# Patient Record
Sex: MALE | URBAN - METROPOLITAN AREA
[De-identification: names, ages, dates, MRNs, and addresses within clinical notes are randomized per-mention and may not be internally consistent; named-entity substitution may affect disease eponyms.]

---
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
## 2024-01-14 DIAGNOSIS — J45.41 MODERATE PERSISTENT ASTHMA WITH ACUTE EXACERBATION: ICD-10-CM

## 2024-01-15 NOTE — TELEPHONE ENCOUNTER
Please advise patient to reach out to insurance company. He saw pulm who recommended symbicort.     He needs to find out which steroid inhaler his insurance covers. I sent multiple which were denied.

## 2024-01-16 RX ORDER — FLUTICASONE PROPIONATE AND SALMETEROL 100; 50 UG/1; UG/1
1 POWDER RESPIRATORY (INHALATION) 2 TIMES DAILY
Qty: 1 BLISTER | Refills: 0 | Status: SHIPPED | OUTPATIENT
Start: 2024-01-16

## 2024-01-30 ENCOUNTER — OFFICE VISIT (OUTPATIENT)
Dept: PULMONOLOGY | Facility: CLINIC | Age: 19
End: 2024-01-30
Payer: COMMERCIAL

## 2024-01-30 VITALS
HEART RATE: 68 BPM | BODY MASS INDEX: 23.64 KG/M2 | HEIGHT: 68 IN | SYSTOLIC BLOOD PRESSURE: 114 MMHG | OXYGEN SATURATION: 96 % | TEMPERATURE: 98 F | DIASTOLIC BLOOD PRESSURE: 60 MMHG | WEIGHT: 156 LBS

## 2024-01-30 DIAGNOSIS — J45.20 MILD INTERMITTENT ASTHMA WITHOUT COMPLICATION: Primary | ICD-10-CM

## 2024-01-30 PROCEDURE — 94010 BREATHING CAPACITY TEST: CPT | Performed by: INTERNAL MEDICINE

## 2024-01-30 PROCEDURE — 99213 OFFICE O/P EST LOW 20 MIN: CPT | Performed by: INTERNAL MEDICINE

## 2024-01-30 RX ORDER — TIOTROPIUM BROMIDE INHALATION SPRAY 1.56 UG/1
2 SPRAY, METERED RESPIRATORY (INHALATION) DAILY
Qty: 4 G | Refills: 0 | Status: SHIPPED | COMMUNITY
Start: 2024-01-30

## 2024-01-30 NOTE — PROGRESS NOTES
Assessment/Plan:    Mild intermittent asthma  This patient is clinical improvement with the daily use of Symbicort.  He rarely uses albuterol.  In spite of this however patient's spirometry is worse than the last visit.  Concerned that he remains with persistent airflow obstruction which seems severe.  To start taking Spiriva at low-dose as a daily basis.  Will call him in a few weeks to ensure that he is doing better.  I anticipate that he will be on Spiriva daily in addition to the Symbicort.  Will meet again in the springtime when his allergies become more active with repeat spirometry.       Diagnoses and all orders for this visit:    Mild intermittent asthma without complication  -     tiotropium (Spiriva Respimat) 1.25 MCG/ACT AERS inhaler; Inhale 2 puffs daily  -     POCT spirometry          Subjective:      Patient ID: Jocelynn Diaz is a 18 y.o. male.    This patient returns for mild remittent asthma.  On the last visit he had an abnormal spirometry with moderate airflow obstruction.  He senses improvement with the daily use of Symbicort.  He states he rarely uses albuterol.  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  No recent infection or ER visits.  No allergy symptoms but anticipates than in the springtime.  Never did have his CBC or IgE measured. No other new Problems.  Patient does not sense wheezing on any regular basis.  No nocturnal symptoms.        The following portions of the patient's history were reviewed and updated as appropriate: allergies, current medications, past family history, past medical history, past social history, past surgical history and problem list.    Review of Systems   Constitutional:  Negative for activity change and unexpected weight change.   HENT:  Negative for congestion and sinus pressure.    Respiratory:  Negative for cough, shortness of breath and wheezing.    Cardiovascular:  Negative for chest pain, palpitations and leg swelling.   Allergic/Immunologic:  "Positive for environmental allergies.         Objective:      /60 (BP Location: Left arm, Patient Position: Sitting, Cuff Size: Standard)   Pulse 68   Temp 98 °F (36.7 °C) (Tympanic)   Ht 5' 8\" (1.727 m)   Wt 70.8 kg (156 lb)   SpO2 96%   BMI 23.72 kg/m²          Physical Exam  Vitals reviewed.   Constitutional:       General: He is not in acute distress.     Appearance: He is normal weight. He is not ill-appearing.   Cardiovascular:      Rate and Rhythm: Normal rate and regular rhythm.      Pulses:           Radial pulses are 2+ on the right side and 2+ on the left side.      Heart sounds: Normal heart sounds.   Pulmonary:      Effort: Pulmonary effort is normal.      Breath sounds: Normal breath sounds. No wheezing or rhonchi.   Musculoskeletal:         General: No swelling.      Cervical back: No rigidity or tenderness.      Right lower leg: No edema.      Left lower leg: No edema.   Lymphadenopathy:      Cervical: No cervical adenopathy.   Skin:     General: Skin is warm and dry.   Neurological:      Mental Status: He is alert and oriented to person, place, and time.   Psychiatric:         Mood and Affect: Mood normal.         Behavior: Behavior normal.         "

## 2024-01-30 NOTE — ASSESSMENT & PLAN NOTE
This patient is clinical improvement with the daily use of Symbicort.  He rarely uses albuterol.  In spite of this however patient's spirometry is worse than the last visit.  Concerned that he remains with persistent airflow obstruction which seems severe.  To start taking Spiriva at low-dose as a daily basis.  Will call him in a few weeks to ensure that he is doing better.  I anticipate that he will be on Spiriva daily in addition to the Symbicort.  Will meet again in the springtime when his allergies become more active with repeat spirometry.

## 2024-02-24 DIAGNOSIS — J45.41 MODERATE PERSISTENT ASTHMA WITH ACUTE EXACERBATION: ICD-10-CM

## 2024-02-24 RX ORDER — FLUTICASONE PROPIONATE AND SALMETEROL 100; 50 UG/1; UG/1
1 POWDER RESPIRATORY (INHALATION) 2 TIMES DAILY
Qty: 60 BLISTER | Refills: 0 | Status: SHIPPED | OUTPATIENT
Start: 2024-02-24

## 2024-03-20 DIAGNOSIS — J45.41 MODERATE PERSISTENT ASTHMA WITH ACUTE EXACERBATION: ICD-10-CM

## 2024-03-20 RX ORDER — FLUTICASONE PROPIONATE AND SALMETEROL 100; 50 UG/1; UG/1
POWDER RESPIRATORY (INHALATION)
Qty: 90 BLISTER | Refills: 0 | Status: SHIPPED | OUTPATIENT
Start: 2024-03-20

## 2024-04-17 ENCOUNTER — TELEPHONE (OUTPATIENT)
Dept: FAMILY MEDICINE CLINIC | Facility: CLINIC | Age: 19
End: 2024-04-17

## 2024-04-17 NOTE — TELEPHONE ENCOUNTER
Patient's mother called in regards to patients Symbicort inhaler, however patient does not have updated communication consent form. Informed mother that I am unable to relay any information without speaking to patient first or patient updating communication consent form. Mother was very upset and proceeded to tell me that I would be responsible if something were to happen to her son because the office gives her information on her son without any problems. Informed mother that per HIPAA we are not to be giving out any medical information since he is above the age of 18. Also mother stated that her number is the main number on his chart. It should be the sons phone number on the chart since he is above the age of 18. Forwarding to office to address and update patients chart.

## 2024-04-17 NOTE — TELEPHONE ENCOUNTER
Message is being forwarded to the office.     Mother called rx line- verbal consent given from patient to speak to mother regarding prescription since he is unable to speak due to patient being at work. I did advise mother the reasoning why information cannot be shared and it was due to patient not filling out an updated communication form. She proceeded to tell me he was in recently and a form was not given to him. Patient's mother was advised to have son take the time to update his communication form online or in the office to avoid the hassle in the future.     Patient needs a prescription that needs renewal. Please review - patient is completely out  budesonide-formoterol (SYMBICORT) 80-4.5 MCG/ACT inhaler   Dispense quantity 10.2g with 5 refills.  Sent to   [id number] - [place] - [phone] [facility]

## 2024-04-17 NOTE — TELEPHONE ENCOUNTER
Called pt at preferred number in regards to symbicort inhaler -- pt mother stated it was addressed and then hung up. No further action needed.

## 2024-04-17 NOTE — TELEPHONE ENCOUNTER
It seems that Symbicort was denied by insurance.  Advair alternative was apparently approved and prescribed on March 20th.

## 2024-04-18 ENCOUNTER — TELEPHONE (OUTPATIENT)
Age: 19
End: 2024-04-18

## 2024-04-18 DIAGNOSIS — J45.20 MILD INTERMITTENT ASTHMA WITHOUT COMPLICATION: Primary | ICD-10-CM

## 2024-04-18 RX ORDER — FLUTICASONE FUROATE AND VILANTEROL 200; 25 UG/1; UG/1
1 POWDER RESPIRATORY (INHALATION) DAILY
Qty: 60 BLISTER | Refills: 5 | Status: SHIPPED | OUTPATIENT
Start: 2024-04-18

## 2024-04-18 NOTE — PROGRESS NOTES
Message from the patient's mother that Wixela is not controlling his asthma symptoms.  The computer indicates that Breo is an alternative but Symbicort is not covered.  Therefore I will prescribe for him Breo.  If this is unsuccessful we will have to put in a preauthorization for Symbicort.

## 2024-04-18 NOTE — TELEPHONE ENCOUNTER
Please put in a new script for Symbicort if appropriate. I can start the PA process once other medication has been d/c'd and new script is put in.    Thank you

## 2024-04-18 NOTE — TELEPHONE ENCOUNTER
Called and spoke w pt mother -- informed of message. Pt mother again is agitated as to medication not being covered. She states that Advair is not working as well as the Symbicort and pt would like to be Re-prescribed symbicort specifically. I informed again of lack of insurance coverage and advised that pt mother reach out to Dr. Aponte's office regarding the issue as this is pt pulmonologist at which point pt mother hung up on me again.

## 2024-04-18 NOTE — TELEPHONE ENCOUNTER
Patients mother called the RX Refill Line. Message is being forwarded to the office. Forwarding to PA team for review.       Patients mother is requesting a new script for budesonide-formoterol (SYMBICORT) 80-4.5 MCG/ACT inhaler, She stated they got this filled once and then moving forward the office told her it was no longer covered and prescribed wixela and spiriva. The patients mother stated neither of those medications are working for the patient and they would like to get Symbicort. She also stated she never received notice from her insurance that Symbicort was not cover so she is unsure why the office is telling her this. I personally did not see anything in the patients files where a PA was denied, There is no documents in Media for Symbicort that I see. There are other encounter if those need to be reviewed for information. Can we please contact the patients insurance to see if indeed this medication does need a PA and if so get the process started, as the alternative are not working. The patient will also now need a new script since the previous one has been discontinued. I will forward to the PA team and the office as I am unsure who would handle this for the patient at this point.    Please contact patients mother at

## 2024-04-30 ENCOUNTER — TELEPHONE (OUTPATIENT)
Age: 19
End: 2024-04-30

## 2024-04-30 ENCOUNTER — OFFICE VISIT (OUTPATIENT)
Dept: PULMONOLOGY | Facility: CLINIC | Age: 19
End: 2024-04-30
Payer: COMMERCIAL

## 2024-04-30 VITALS
OXYGEN SATURATION: 97 % | HEIGHT: 68 IN | DIASTOLIC BLOOD PRESSURE: 72 MMHG | TEMPERATURE: 98.2 F | WEIGHT: 160 LBS | BODY MASS INDEX: 24.25 KG/M2 | HEART RATE: 58 BPM | SYSTOLIC BLOOD PRESSURE: 110 MMHG

## 2024-04-30 DIAGNOSIS — J45.20 MILD INTERMITTENT ASTHMA, UNSPECIFIED WHETHER COMPLICATED: ICD-10-CM

## 2024-04-30 PROCEDURE — 99213 OFFICE O/P EST LOW 20 MIN: CPT | Performed by: INTERNAL MEDICINE

## 2024-04-30 RX ORDER — ALBUTEROL SULFATE 90 UG/1
2 AEROSOL, METERED RESPIRATORY (INHALATION) EVERY 6 HOURS PRN
Qty: 18 G | Refills: 5 | Status: SHIPPED | OUTPATIENT
Start: 2024-04-30

## 2024-04-30 NOTE — ASSESSMENT & PLAN NOTE
Asthma has been calm since being on Breo.  I get the feeling patient sometimes neglects to take this on a daily basis and I encouraged that.  He also is taking low-dose Spiriva.  Rarely uses albuterol which I renewed.  I encouraged him to have the 2 blood samples that I requested earlier to see whether he has a tendency towards allergy.  This information sometimes is also helpful if patient's need to be on injectable medications to control their asthma.

## 2024-04-30 NOTE — PROGRESS NOTES
Assessment/Plan:    Mild intermittent asthma  Asthma has been calm since being on Breo.  I get the feeling patient sometimes neglects to take this on a daily basis and I encouraged that.  He also is taking low-dose Spiriva.  Rarely uses albuterol which I renewed.  I encouraged him to have the 2 blood samples that I requested earlier to see whether he has a tendency towards allergy.  This information sometimes is also helpful if patient's need to be on injectable medications to control their asthma.     Diagnoses and all orders for this visit:    Mild intermittent asthma, unspecified whether complicated  -     albuterol (ProAir HFA) 90 mcg/act inhaler; Inhale 2 puffs every 6 (six) hours as needed for wheezing          Subjective:      Patient ID: Jocelynn Diaz is a 18 y.o. male.    This patient returns for reevaluation of asthma.  Since his previous visit he has been started on Breo which is covered by his insurance.  He senses that this has been very helpful for him.  Previously he had been on Advair which was not helpful.  I get the feeling that he does not take this every day and I encouraged him to do so.  He is using Spiriva on a daily basis.  Rarely uses albuterol now.  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  Symptoms of allergy are minimal if at all present.  I asked him to have those previous blood samples requested to see if he has a tendency towards allergy.  Did have allergy as a younger person.  States that he previously had eczema.  No one in the household smokes.  Not sensitive to odor.        The following portions of the patient's history were reviewed and updated as appropriate: allergies, current medications, past family history, past medical history, past social history, past surgical history and problem list.    Review of Systems   Constitutional:  Negative for activity change and unexpected weight change.   HENT:  Positive for  "rhinorrhea. Negative for congestion.    Eyes:  Negative for itching.   Respiratory:  Negative for cough, shortness of breath and wheezing.    Cardiovascular:  Negative for chest pain, palpitations and leg swelling.   Allergic/Immunologic: Negative for environmental allergies.        Allergy-probably not         Objective:      /72 (BP Location: Left arm, Patient Position: Sitting, Cuff Size: Standard)   Pulse 58   Temp 98.2 °F (36.8 °C) (Tympanic)   Ht 5' 8\" (1.727 m)   Wt 72.6 kg (160 lb)   SpO2 97%   BMI 24.33 kg/m²          Physical Exam  Vitals reviewed.   Constitutional:       General: He is not in acute distress.     Appearance: He is normal weight. He is not ill-appearing.   Cardiovascular:      Rate and Rhythm: Normal rate and regular rhythm.      Pulses:           Radial pulses are 2+ on the right side and 2+ on the left side.      Heart sounds: Normal heart sounds.   Pulmonary:      Effort: Pulmonary effort is normal.      Breath sounds: Normal breath sounds. No stridor. No wheezing.   Musculoskeletal:         General: No swelling.      Cervical back: No rigidity or tenderness.      Right lower leg: No edema.      Left lower leg: No edema.   Lymphadenopathy:      Cervical: No cervical adenopathy.   Skin:     General: Skin is warm and dry.   Neurological:      Mental Status: He is alert and oriented to person, place, and time.   Psychiatric:         Mood and Affect: Mood normal.         Behavior: Behavior normal.         "

## 2024-05-13 ENCOUNTER — TELEPHONE (OUTPATIENT)
Age: 19
End: 2024-05-13

## 2024-05-13 DIAGNOSIS — L30.8 OTHER ECZEMA: Primary | ICD-10-CM

## 2024-05-13 RX ORDER — MOMETASONE FUROATE 1 MG/G
CREAM TOPICAL DAILY
Qty: 45 G | Refills: 3 | Status: SHIPPED | OUTPATIENT
Start: 2024-05-13

## 2024-05-13 NOTE — TELEPHONE ENCOUNTER
Mom calling stating son is having issues with Eczema, asked if Dr. Aponte handles. Advised to call PCP, but will ask if you could assist. Stated this was brought up in the last visit. Please advise.

## 2024-05-13 NOTE — TELEPHONE ENCOUNTER
Pt's mother called. Pt has eczema on his neck and arms. I explained to mother that he would need an appt. It does not look like he was seen for eczema. She declined and said she would didn't want to bring him for every little thing and to just ask if cream can be sent to [facility] on [facility]. Please, advise.

## 2024-10-29 ENCOUNTER — OFFICE VISIT (OUTPATIENT)
Dept: PULMONOLOGY | Facility: CLINIC | Age: 19
End: 2024-10-29
Payer: COMMERCIAL

## 2024-10-29 VITALS
OXYGEN SATURATION: 95 % | DIASTOLIC BLOOD PRESSURE: 72 MMHG | HEART RATE: 60 BPM | TEMPERATURE: 96.7 F | HEIGHT: 68 IN | WEIGHT: 161.8 LBS | SYSTOLIC BLOOD PRESSURE: 120 MMHG | BODY MASS INDEX: 24.52 KG/M2

## 2024-10-29 DIAGNOSIS — J45.20 MILD INTERMITTENT ASTHMA WITHOUT COMPLICATION: Primary | ICD-10-CM

## 2024-10-29 PROCEDURE — 99212 OFFICE O/P EST SF 10 MIN: CPT | Performed by: INTERNAL MEDICINE

## 2024-10-29 RX ORDER — FLUTICASONE PROPIONATE AND SALMETEROL 100; 50 UG/1; UG/1
1 POWDER RESPIRATORY (INHALATION) 2 TIMES DAILY
COMMUNITY

## 2024-10-29 NOTE — PROGRESS NOTES
Assessment/Plan:    Mild intermittent asthma  Asthma has been calm since we last spoke.  He has not had any exacerbations.  Had a respiratory infection about a month ago without any major problems with asthma.  Previous Breo has been changed to fluticasone-salmeterol and he has Wixela brand.  Depending on the course he might do just as well with albuterol-budesonide if this were available to him.  No longer takes Spiriva and does not sense the need for it.  Uses albuterol primarily in a preventative fashion but has not had any flareups requiring symptomatic treatment.  Has springtime allergies.     Diagnoses and all orders for this visit:    Mild intermittent asthma without complication    Other orders  -     Fluticasone-Salmeterol (Advair) 100-50 mcg/dose inhaler; Inhale 1 puff 2 (two) times a day Rinse mouth after use.          Subjective:      Patient ID: Jocelynn Diaz is a 19 y.o. male.    The patient returns for reevaluation of allergic asthma.  Since his last visit here in April he has not had any flares of asthma.  Had a respiratory infection about a month ago without any major problems with asthma.  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  Has springtime allergies but does not recall that there was any major problem in the spring.  The longer takes Spiriva based on a sense of not needing it.  Previous Breo has been switched to fluticasone-salmeterol and he shows me that he has Wixela brand.  Uses albuterol only in a preventative fashion.  No new other health problems.        The following portions of the patient's history were reviewed and updated as appropriate: allergies, current medications, past family history, past medical history, past social history, past surgical history and problem list.    Review of Systems   Constitutional:  Negative for activity change and unexpected weight change.   HENT:  Negative for sinus pressure.    Respiratory:  Negative for cough, shortness of breath  "and wheezing.    Cardiovascular:  Negative for chest pain, palpitations and leg swelling.   Allergic/Immunologic: Positive for environmental allergies.         Objective:      /72 (BP Location: Left arm, Patient Position: Sitting, Cuff Size: Standard)   Pulse 60   Temp (!) 96.7 °F (35.9 °C) (Tympanic)   Ht 5' 8\" (1.727 m)   Wt 73.4 kg (161 lb 12.8 oz)   SpO2 95%   BMI 24.60 kg/m²          Physical Exam  Vitals reviewed.   Constitutional:       General: He is not in acute distress.     Appearance: He is normal weight. He is not ill-appearing.   Cardiovascular:      Rate and Rhythm: Normal rate and regular rhythm.      Pulses:           Radial pulses are 2+ on the right side and 2+ on the left side.      Heart sounds: Normal heart sounds.   Pulmonary:      Effort: Pulmonary effort is normal.      Breath sounds: Examination of the right-lower field reveals wheezing. Examination of the left-lower field reveals wheezing. Wheezing present. No rhonchi.      Comments: Few inspiratory squeaks in both lower lobes  Musculoskeletal:         General: No swelling.      Cervical back: No rigidity or tenderness.      Right lower leg: No edema.      Left lower leg: No edema.   Lymphadenopathy:      Cervical: No cervical adenopathy.   Skin:     General: Skin is warm and dry.   Neurological:      Mental Status: He is alert and oriented to person, place, and time.   Psychiatric:         Mood and Affect: Mood normal.         Behavior: Behavior normal.         "

## 2024-10-29 NOTE — ASSESSMENT & PLAN NOTE
Asthma has been calm since we last spoke.  He has not had any exacerbations.  Had a respiratory infection about a month ago without any major problems with asthma.  Previous Breo has been changed to fluticasone-salmeterol and he has Wixela brand.  Depending on the course he might do just as well with albuterol-budesonide if this were available to him.  No longer takes Spiriva and does not sense the need for it.  Uses albuterol primarily in a preventative fashion but has not had any flareups requiring symptomatic treatment.  Has springtime allergies.

## 2024-12-02 DIAGNOSIS — J45.20 MILD INTERMITTENT ASTHMA WITHOUT COMPLICATION: Primary | ICD-10-CM

## 2024-12-02 NOTE — TELEPHONE ENCOUNTER
Reason for call:   [x] Refill   [] Prior Auth  [] Other:     Office:   [] PCP/Provider -   [x] Specialty/Provider - pulm deepika/Fay    Fluticasone-Salmeterol 100-50mcg  1 puff bid #60    Albuterol 0.083% solution  1 vial q6h prn #180ml    Pharmacy: [facility]/pharmacy #[id number]  [place] - [address] [place] [address] [phone]     Does the patient have enough for 3 days?   [] Yes   [x] No - Send as HP to POD

## 2024-12-03 ENCOUNTER — TELEPHONE (OUTPATIENT)
Age: 19
End: 2024-12-03

## 2024-12-03 DIAGNOSIS — J45.20 MILD INTERMITTENT ASTHMA, UNSPECIFIED WHETHER COMPLICATED: ICD-10-CM

## 2024-12-03 RX ORDER — FLUTICASONE PROPIONATE AND SALMETEROL 100; 50 UG/1; UG/1
1 POWDER RESPIRATORY (INHALATION) 2 TIMES DAILY
Qty: 60 BLISTER | Refills: 5 | Status: SHIPPED | OUTPATIENT
Start: 2024-12-03 | End: 2024-12-10 | Stop reason: SDUPTHER

## 2024-12-03 NOTE — TELEPHONE ENCOUNTER
PA for ADVAIR SUBMITTED to [facility]    via    []CMM-KEY:   [x]Surescripts-Case ID # [id number]  []Availity-Auth ID # NDC #   []Faxed to plan   []Other website   []Phone call Case ID #     X-PA sent as URGENT    All office notes, labs and other pertaining documents and studies sent. Clinical questions answered. Awaiting determination from insurance company.     Turnaround time for your insurance to make a decision on your Prior Authorization can take 7-21 business days.

## 2024-12-03 NOTE — TELEPHONE ENCOUNTER
Pt's mother calls and states she is following up on refill requesting informed her it was sent to the pharmacy today. She verbalizes understanding and gives thanks

## 2024-12-03 NOTE — TELEPHONE ENCOUNTER
Reason for call:   [x] Refill   [] Prior Auth  [] Other:     Office:   [x] PCP/Provider -   [] Specialty/Provider -     Medication: albuterol (2.5 mg/3 mL) 0.083 % nebulizer solution     Dose/Frequency: Take 3 mL (2.5 mg total) by nebulization every 6 (six) hours as needed for wheezing or shortness of breath     Quantity: 180 ml    Pharmacy: [facility]/pharmacy #[id number]  [place] - [address] [place] [address] [phone]    Does the patient have enough for 3 days?   [] Yes   [x] No - Send as HP to POD

## 2024-12-04 ENCOUNTER — TELEPHONE (OUTPATIENT)
Age: 19
End: 2024-12-04

## 2024-12-04 RX ORDER — ALBUTEROL SULFATE 0.83 MG/ML
2.5 SOLUTION RESPIRATORY (INHALATION) EVERY 6 HOURS PRN
Qty: 180 ML | Refills: 0 | Status: SHIPPED | OUTPATIENT
Start: 2024-12-04

## 2024-12-04 NOTE — TELEPHONE ENCOUNTER
PA for ADVAIR DENIED    Reason:(Screenshot if applicable)        Message sent to office clinical pool Yes    Denial letter scanned into Media Yes    Appeal started No (Provider will need to decide if appeal is warranted and send clinical documentation to Prior Authorization Team for initiation.)    **Please follow up with your patient regarding denial and next steps**

## 2024-12-04 NOTE — TELEPHONE ENCOUNTER
Spoke with pt's mom- he will call us when he can to schedule wellness visit. Mom stated that she will have pulmonology fill nebulizer/.

## 2024-12-04 NOTE — TELEPHONE ENCOUNTER
Pt mother calling in to see if provider can send new script from us to his pharmacy for his nebulizer Albuterol. Please advise

## 2024-12-05 DIAGNOSIS — J45.20 MILD INTERMITTENT ASTHMA, UNSPECIFIED WHETHER COMPLICATED: ICD-10-CM

## 2024-12-05 RX ORDER — ALBUTEROL SULFATE 90 UG/1
2 INHALANT RESPIRATORY (INHALATION) EVERY 6 HOURS PRN
Qty: 18 G | Refills: 5 | Status: SHIPPED | OUTPATIENT
Start: 2024-12-05

## 2024-12-05 NOTE — PROGRESS NOTES
Reorder albuterol inhaler.  There was a request for albuterol by nebulizer but I do not sense that this is likely necessary unless patient's situation has changed.

## 2024-12-10 DIAGNOSIS — J45.20 MILD INTERMITTENT ASTHMA WITHOUT COMPLICATION: ICD-10-CM

## 2024-12-10 RX ORDER — FLUTICASONE PROPIONATE AND SALMETEROL 100; 50 UG/1; UG/1
1 POWDER RESPIRATORY (INHALATION) 2 TIMES DAILY
Qty: 60 BLISTER | Refills: 5 | Status: SHIPPED | OUTPATIENT
Start: 2024-12-10

## 2024-12-10 NOTE — TELEPHONE ENCOUNTER
Called pt's mother to follow up on if inhaler was picked up. LM message to call office back at her earliest convenience.

## 2025-04-23 ENCOUNTER — TELEPHONE (OUTPATIENT)
Dept: FAMILY MEDICINE CLINIC | Facility: CLINIC | Age: 20
End: 2025-04-23

## 2025-06-20 DIAGNOSIS — J45.20 MILD INTERMITTENT ASTHMA, UNSPECIFIED WHETHER COMPLICATED: ICD-10-CM

## 2025-06-20 DIAGNOSIS — J45.20 MILD INTERMITTENT ASTHMA WITHOUT COMPLICATION: ICD-10-CM

## 2025-06-20 RX ORDER — FLUTICASONE PROPIONATE AND SALMETEROL 100; 50 UG/1; UG/1
1 POWDER RESPIRATORY (INHALATION) 2 TIMES DAILY
Qty: 60 BLISTER | Refills: 2 | Status: SHIPPED | OUTPATIENT
Start: 2025-06-20

## 2025-06-20 RX ORDER — ALBUTEROL SULFATE 90 UG/1
2 INHALANT RESPIRATORY (INHALATION) EVERY 6 HOURS PRN
Qty: 18 G | Refills: 2 | Status: SHIPPED | OUTPATIENT
Start: 2025-06-20

## 2025-06-20 NOTE — TELEPHONE ENCOUNTER
Patient is requesting a call back from [facility] because patient is experiencing pain in chest. I offer to get patient to nurse but he decline. Patient just wants to speak with doctor or [facility] Please advise

## 2025-07-06 DIAGNOSIS — L30.8 OTHER ECZEMA: ICD-10-CM

## 2025-07-08 RX ORDER — MOMETASONE FUROATE 1 MG/G
CREAM TOPICAL
Qty: 45 G | Refills: 0 | Status: SHIPPED | OUTPATIENT
Start: 2025-07-08